# Patient Record
Sex: MALE | Race: WHITE | HISPANIC OR LATINO | Employment: FULL TIME | URBAN - METROPOLITAN AREA
[De-identification: names, ages, dates, MRNs, and addresses within clinical notes are randomized per-mention and may not be internally consistent; named-entity substitution may affect disease eponyms.]

---

## 2020-06-05 ENCOUNTER — OFFICE VISIT (OUTPATIENT)
Dept: ADMISSIONS | Facility: MEDICAL CENTER | Age: 50
End: 2020-06-05
Attending: SURGERY
Payer: COMMERCIAL

## 2020-06-05 DIAGNOSIS — Z01.812 PRE-OPERATIVE LABORATORY EXAMINATION: ICD-10-CM

## 2020-06-05 LAB
ANION GAP SERPL CALC-SCNC: 11 MMOL/L (ref 7–16)
BASOPHILS # BLD AUTO: 0.4 % (ref 0–1.8)
BASOPHILS # BLD: 0.03 K/UL (ref 0–0.12)
BUN SERPL-MCNC: 19 MG/DL (ref 8–22)
CALCIUM SERPL-MCNC: 9.4 MG/DL (ref 8.5–10.5)
CHLORIDE SERPL-SCNC: 102 MMOL/L (ref 96–112)
CO2 SERPL-SCNC: 24 MMOL/L (ref 20–33)
COVID ORDER STATUS COVID19: NORMAL
CREAT SERPL-MCNC: 0.75 MG/DL (ref 0.5–1.4)
EOSINOPHIL # BLD AUTO: 0.08 K/UL (ref 0–0.51)
EOSINOPHIL NFR BLD: 1 % (ref 0–6.9)
ERYTHROCYTE [DISTWIDTH] IN BLOOD BY AUTOMATED COUNT: 40.9 FL (ref 35.9–50)
GLUCOSE SERPL-MCNC: 127 MG/DL (ref 65–99)
HCT VFR BLD AUTO: 47.9 % (ref 42–52)
HGB BLD-MCNC: 16.2 G/DL (ref 14–18)
IMM GRANULOCYTES # BLD AUTO: 0.02 K/UL (ref 0–0.11)
IMM GRANULOCYTES NFR BLD AUTO: 0.2 % (ref 0–0.9)
LYMPHOCYTES # BLD AUTO: 2.59 K/UL (ref 1–4.8)
LYMPHOCYTES NFR BLD: 32.2 % (ref 22–41)
MCH RBC QN AUTO: 29.5 PG (ref 27–33)
MCHC RBC AUTO-ENTMCNC: 33.8 G/DL (ref 33.7–35.3)
MCV RBC AUTO: 87.1 FL (ref 81.4–97.8)
MONOCYTES # BLD AUTO: 0.59 K/UL (ref 0–0.85)
MONOCYTES NFR BLD AUTO: 7.3 % (ref 0–13.4)
NEUTROPHILS # BLD AUTO: 4.73 K/UL (ref 1.82–7.42)
NEUTROPHILS NFR BLD: 58.9 % (ref 44–72)
NRBC # BLD AUTO: 0 K/UL
NRBC BLD-RTO: 0 /100 WBC
PLATELET # BLD AUTO: 214 K/UL (ref 164–446)
PMV BLD AUTO: 10.6 FL (ref 9–12.9)
POTASSIUM SERPL-SCNC: 4.2 MMOL/L (ref 3.6–5.5)
RBC # BLD AUTO: 5.5 M/UL (ref 4.7–6.1)
SODIUM SERPL-SCNC: 137 MMOL/L (ref 135–145)
WBC # BLD AUTO: 8 K/UL (ref 4.8–10.8)

## 2020-06-05 PROCEDURE — 36415 COLL VENOUS BLD VENIPUNCTURE: CPT

## 2020-06-05 PROCEDURE — 85025 COMPLETE CBC W/AUTO DIFF WBC: CPT

## 2020-06-05 PROCEDURE — 80048 BASIC METABOLIC PNL TOTAL CA: CPT

## 2020-06-05 PROCEDURE — C9803 HOPD COVID-19 SPEC COLLECT: HCPCS

## 2020-06-05 RX ORDER — M-VIT,TX,IRON,MINS/CALC/FOLIC 27MG-0.4MG
1 TABLET ORAL DAILY
COMMUNITY

## 2020-06-07 LAB
SARS-COV-2 RNA RESP QL NAA+PROBE: NOT DETECTED
SPECIMEN SOURCE: NORMAL

## 2020-06-10 ENCOUNTER — ANESTHESIA (OUTPATIENT)
Dept: SURGERY | Facility: MEDICAL CENTER | Age: 50
End: 2020-06-10
Payer: COMMERCIAL

## 2020-06-10 ENCOUNTER — HOSPITAL ENCOUNTER (OUTPATIENT)
Facility: MEDICAL CENTER | Age: 50
End: 2020-06-10
Attending: SURGERY | Admitting: SURGERY
Payer: COMMERCIAL

## 2020-06-10 ENCOUNTER — ANESTHESIA EVENT (OUTPATIENT)
Dept: SURGERY | Facility: MEDICAL CENTER | Age: 50
End: 2020-06-10
Payer: COMMERCIAL

## 2020-06-10 VITALS
HEIGHT: 68 IN | WEIGHT: 229.94 LBS | SYSTOLIC BLOOD PRESSURE: 108 MMHG | RESPIRATION RATE: 14 BRPM | TEMPERATURE: 97.7 F | BODY MASS INDEX: 34.85 KG/M2 | DIASTOLIC BLOOD PRESSURE: 66 MMHG | HEART RATE: 73 BPM | OXYGEN SATURATION: 100 %

## 2020-06-10 DIAGNOSIS — K40.91 UNILATERAL RECURRENT INGUINAL HERNIA WITHOUT OBSTRUCTION OR GANGRENE: ICD-10-CM

## 2020-06-10 PROCEDURE — 700101 HCHG RX REV CODE 250: Performed by: ANESTHESIOLOGY

## 2020-06-10 PROCEDURE — 160048 HCHG OR STATISTICAL LEVEL 1-5: Performed by: SURGERY

## 2020-06-10 PROCEDURE — C1781 MESH (IMPLANTABLE): HCPCS | Performed by: SURGERY

## 2020-06-10 PROCEDURE — 160036 HCHG PACU - EA ADDL 30 MINS PHASE I: Performed by: SURGERY

## 2020-06-10 PROCEDURE — 160002 HCHG RECOVERY MINUTES (STAT): Performed by: SURGERY

## 2020-06-10 PROCEDURE — A9270 NON-COVERED ITEM OR SERVICE: HCPCS | Performed by: ANESTHESIOLOGY

## 2020-06-10 PROCEDURE — 160028 HCHG SURGERY MINUTES - 1ST 30 MINS LEVEL 3: Performed by: SURGERY

## 2020-06-10 PROCEDURE — 160025 RECOVERY II MINUTES (STATS): Performed by: SURGERY

## 2020-06-10 PROCEDURE — 160009 HCHG ANES TIME/MIN: Performed by: SURGERY

## 2020-06-10 PROCEDURE — 160046 HCHG PACU - 1ST 60 MINS PHASE II: Performed by: SURGERY

## 2020-06-10 PROCEDURE — 501838 HCHG SUTURE GENERAL: Performed by: SURGERY

## 2020-06-10 PROCEDURE — 500380 HCHG DRAIN, PENROSE 1/4X12: Performed by: SURGERY

## 2020-06-10 PROCEDURE — A9270 NON-COVERED ITEM OR SERVICE: HCPCS | Performed by: SURGERY

## 2020-06-10 PROCEDURE — 700111 HCHG RX REV CODE 636 W/ 250 OVERRIDE (IP): Performed by: ANESTHESIOLOGY

## 2020-06-10 PROCEDURE — 160035 HCHG PACU - 1ST 60 MINS PHASE I: Performed by: SURGERY

## 2020-06-10 PROCEDURE — 500002 HCHG ADHESIVE, DERMABOND: Performed by: SURGERY

## 2020-06-10 PROCEDURE — 700105 HCHG RX REV CODE 258: Performed by: SURGERY

## 2020-06-10 PROCEDURE — 160047 HCHG PACU  - EA ADDL 30 MINS PHASE II: Performed by: SURGERY

## 2020-06-10 PROCEDURE — 160039 HCHG SURGERY MINUTES - EA ADDL 1 MIN LEVEL 3: Performed by: SURGERY

## 2020-06-10 PROCEDURE — 700102 HCHG RX REV CODE 250 W/ 637 OVERRIDE(OP): Performed by: ANESTHESIOLOGY

## 2020-06-10 PROCEDURE — 700102 HCHG RX REV CODE 250 W/ 637 OVERRIDE(OP): Performed by: SURGERY

## 2020-06-10 PROCEDURE — 700101 HCHG RX REV CODE 250: Performed by: SURGERY

## 2020-06-10 DEVICE — MESH PROGRIP LEFT (1/EA): Type: IMPLANTABLE DEVICE | Site: GROIN | Status: FUNCTIONAL

## 2020-06-10 RX ORDER — DIPHENHYDRAMINE HYDROCHLORIDE 50 MG/ML
12.5 INJECTION INTRAMUSCULAR; INTRAVENOUS
Status: DISCONTINUED | OUTPATIENT
Start: 2020-06-10 | End: 2020-06-10 | Stop reason: HOSPADM

## 2020-06-10 RX ORDER — DEXAMETHASONE SODIUM PHOSPHATE 4 MG/ML
INJECTION, SOLUTION INTRA-ARTICULAR; INTRALESIONAL; INTRAMUSCULAR; INTRAVENOUS; SOFT TISSUE PRN
Status: DISCONTINUED | OUTPATIENT
Start: 2020-06-10 | End: 2020-06-10 | Stop reason: SURG

## 2020-06-10 RX ORDER — MIDAZOLAM HYDROCHLORIDE 1 MG/ML
INJECTION INTRAMUSCULAR; INTRAVENOUS PRN
Status: DISCONTINUED | OUTPATIENT
Start: 2020-06-10 | End: 2020-06-10 | Stop reason: SURG

## 2020-06-10 RX ORDER — LABETALOL HYDROCHLORIDE 5 MG/ML
5 INJECTION, SOLUTION INTRAVENOUS
Status: DISCONTINUED | OUTPATIENT
Start: 2020-06-10 | End: 2020-06-10 | Stop reason: HOSPADM

## 2020-06-10 RX ORDER — BUPIVACAINE HYDROCHLORIDE AND EPINEPHRINE 2.5; 5 MG/ML; UG/ML
INJECTION, SOLUTION EPIDURAL; INFILTRATION; INTRACAUDAL; PERINEURAL
Status: DISCONTINUED | OUTPATIENT
Start: 2020-06-10 | End: 2020-06-10 | Stop reason: HOSPADM

## 2020-06-10 RX ORDER — HYDROCODONE BITARTRATE AND ACETAMINOPHEN 5; 325 MG/1; MG/1
1 TABLET ORAL EVERY 6 HOURS PRN
Qty: 20 TAB | Refills: 0 | Status: SHIPPED | OUTPATIENT
Start: 2020-06-10 | End: 2020-06-14

## 2020-06-10 RX ORDER — HYDROMORPHONE HYDROCHLORIDE 1 MG/ML
0.4 INJECTION, SOLUTION INTRAMUSCULAR; INTRAVENOUS; SUBCUTANEOUS
Status: DISCONTINUED | OUTPATIENT
Start: 2020-06-10 | End: 2020-06-10 | Stop reason: HOSPADM

## 2020-06-10 RX ORDER — CEFAZOLIN SODIUM 1 G/3ML
INJECTION, POWDER, FOR SOLUTION INTRAMUSCULAR; INTRAVENOUS PRN
Status: DISCONTINUED | OUTPATIENT
Start: 2020-06-10 | End: 2020-06-10 | Stop reason: SURG

## 2020-06-10 RX ORDER — SODIUM CHLORIDE, SODIUM LACTATE, POTASSIUM CHLORIDE, CALCIUM CHLORIDE 600; 310; 30; 20 MG/100ML; MG/100ML; MG/100ML; MG/100ML
INJECTION, SOLUTION INTRAVENOUS CONTINUOUS
Status: DISCONTINUED | OUTPATIENT
Start: 2020-06-10 | End: 2020-06-10 | Stop reason: HOSPADM

## 2020-06-10 RX ORDER — HYDROMORPHONE HYDROCHLORIDE 1 MG/ML
0.2 INJECTION, SOLUTION INTRAMUSCULAR; INTRAVENOUS; SUBCUTANEOUS
Status: DISCONTINUED | OUTPATIENT
Start: 2020-06-10 | End: 2020-06-10 | Stop reason: HOSPADM

## 2020-06-10 RX ORDER — HYDROMORPHONE HYDROCHLORIDE 2 MG/ML
INJECTION, SOLUTION INTRAMUSCULAR; INTRAVENOUS; SUBCUTANEOUS PRN
Status: DISCONTINUED | OUTPATIENT
Start: 2020-06-10 | End: 2020-06-10 | Stop reason: SURG

## 2020-06-10 RX ORDER — OXYCODONE HCL 5 MG/5 ML
5 SOLUTION, ORAL ORAL
Status: COMPLETED | OUTPATIENT
Start: 2020-06-10 | End: 2020-06-10

## 2020-06-10 RX ORDER — GABAPENTIN 300 MG/1
300 CAPSULE ORAL ONCE
Status: COMPLETED | OUTPATIENT
Start: 2020-06-10 | End: 2020-06-10

## 2020-06-10 RX ORDER — ONDANSETRON 2 MG/ML
4 INJECTION INTRAMUSCULAR; INTRAVENOUS
Status: COMPLETED | OUTPATIENT
Start: 2020-06-10 | End: 2020-06-10

## 2020-06-10 RX ORDER — LIDOCAINE HYDROCHLORIDE 20 MG/ML
INJECTION, SOLUTION EPIDURAL; INFILTRATION; INTRACAUDAL; PERINEURAL PRN
Status: DISCONTINUED | OUTPATIENT
Start: 2020-06-10 | End: 2020-06-10 | Stop reason: SURG

## 2020-06-10 RX ORDER — HYDROMORPHONE HYDROCHLORIDE 1 MG/ML
0.1 INJECTION, SOLUTION INTRAMUSCULAR; INTRAVENOUS; SUBCUTANEOUS
Status: DISCONTINUED | OUTPATIENT
Start: 2020-06-10 | End: 2020-06-10 | Stop reason: HOSPADM

## 2020-06-10 RX ORDER — ACETAMINOPHEN 500 MG
1000 TABLET ORAL ONCE
Status: COMPLETED | OUTPATIENT
Start: 2020-06-10 | End: 2020-06-10

## 2020-06-10 RX ORDER — KETOROLAC TROMETHAMINE 30 MG/ML
INJECTION, SOLUTION INTRAMUSCULAR; INTRAVENOUS PRN
Status: DISCONTINUED | OUTPATIENT
Start: 2020-06-10 | End: 2020-06-10 | Stop reason: SURG

## 2020-06-10 RX ORDER — ROCURONIUM BROMIDE 10 MG/ML
INJECTION, SOLUTION INTRAVENOUS PRN
Status: DISCONTINUED | OUTPATIENT
Start: 2020-06-10 | End: 2020-06-10 | Stop reason: SURG

## 2020-06-10 RX ORDER — OXYCODONE HCL 5 MG/5 ML
10 SOLUTION, ORAL ORAL
Status: COMPLETED | OUTPATIENT
Start: 2020-06-10 | End: 2020-06-10

## 2020-06-10 RX ORDER — HYDRALAZINE HYDROCHLORIDE 20 MG/ML
5 INJECTION INTRAMUSCULAR; INTRAVENOUS
Status: DISCONTINUED | OUTPATIENT
Start: 2020-06-10 | End: 2020-06-10 | Stop reason: HOSPADM

## 2020-06-10 RX ADMIN — ROCURONIUM BROMIDE 50 MG: 10 INJECTION, SOLUTION INTRAVENOUS at 14:48

## 2020-06-10 RX ADMIN — SUGAMMADEX 200 MG: 100 INJECTION, SOLUTION INTRAVENOUS at 15:58

## 2020-06-10 RX ADMIN — ONDANSETRON HYDROCHLORIDE 4 MG: 2 SOLUTION INTRAMUSCULAR; INTRAVENOUS at 17:56

## 2020-06-10 RX ADMIN — HYDROMORPHONE HYDROCHLORIDE 1 MG: 2 INJECTION, SOLUTION INTRAMUSCULAR; INTRAVENOUS; SUBCUTANEOUS at 15:26

## 2020-06-10 RX ADMIN — KETOROLAC TROMETHAMINE 30 MG: 30 INJECTION, SOLUTION INTRAMUSCULAR at 15:10

## 2020-06-10 RX ADMIN — OXYCODONE HYDROCHLORIDE 10 MG: 5 SOLUTION ORAL at 16:39

## 2020-06-10 RX ADMIN — ACETAMINOPHEN 1000 MG: 500 TABLET ORAL at 12:20

## 2020-06-10 RX ADMIN — POVIDONE-IODINE 15 ML: 10 SOLUTION TOPICAL at 12:20

## 2020-06-10 RX ADMIN — DEXAMETHASONE SODIUM PHOSPHATE 8 MG: 4 INJECTION, SOLUTION INTRA-ARTICULAR; INTRALESIONAL; INTRAMUSCULAR; INTRAVENOUS; SOFT TISSUE at 14:48

## 2020-06-10 RX ADMIN — DIPHENHYDRAMINE HYDROCHLORIDE 12.5 MG: 50 INJECTION INTRAMUSCULAR; INTRAVENOUS at 18:13

## 2020-06-10 RX ADMIN — FENTANYL CITRATE 100 MCG: 50 INJECTION INTRAMUSCULAR; INTRAVENOUS at 14:48

## 2020-06-10 RX ADMIN — SODIUM CHLORIDE, POTASSIUM CHLORIDE, SODIUM LACTATE AND CALCIUM CHLORIDE: 600; 310; 30; 20 INJECTION, SOLUTION INTRAVENOUS at 12:44

## 2020-06-10 RX ADMIN — CEFAZOLIN 2 G: 330 INJECTION, POWDER, FOR SOLUTION INTRAMUSCULAR; INTRAVENOUS at 14:40

## 2020-06-10 RX ADMIN — MIDAZOLAM HYDROCHLORIDE 2 MG: 1 INJECTION, SOLUTION INTRAMUSCULAR; INTRAVENOUS at 14:40

## 2020-06-10 RX ADMIN — LIDOCAINE HYDROCHLORIDE 100 MG: 20 INJECTION, SOLUTION EPIDURAL; INFILTRATION; INTRACAUDAL at 14:48

## 2020-06-10 RX ADMIN — PROPOFOL 200 MG: 10 INJECTION, EMULSION INTRAVENOUS at 14:48

## 2020-06-10 RX ADMIN — LIDOCAINE HYDROCHLORIDE 0.5 ML: 10 INJECTION, SOLUTION EPIDURAL; INFILTRATION; INTRACAUDAL at 12:21

## 2020-06-10 RX ADMIN — GABAPENTIN 300 MG: 300 CAPSULE ORAL at 12:20

## 2020-06-10 ASSESSMENT — PAIN SCALES - GENERAL: PAIN_LEVEL: 2

## 2020-06-10 NOTE — ANESTHESIA PREPROCEDURE EVALUATION
49 yo for repair of recurrent LIH (3 rd time, last was 20 years ago    PMH: obesity (BMI 33)  Relevant Problems   No relevant active problems       Physical Exam    Airway   Mallampati: II  TM distance: >3 FB  Neck ROM: full       Cardiovascular - normal exam  Rhythm: regular  Rate: normal  (-) murmur     Dental - normal exam           Pulmonary - normal exam  Breath sounds clear to auscultation     Abdominal    Neurological - normal exam                 Anesthesia Plan    ASA 2       Plan - general       Airway plan will be ETT  (TAP block)    Plan Factors:   Patient was not previously instructed to abstain from smoking on day of procedure.  Patient did not smoke on day of procedure.      Induction: intravenous    Postoperative Plan: Postoperative administration of opioids is intended.    Pertinent diagnostic labs and testing reviewed    Informed Consent:    Anesthetic plan and risks discussed with patient.    Use of blood products discussed with: patient whom consented to blood products.

## 2020-06-10 NOTE — ANESTHESIA PROCEDURE NOTES
Airway    Date/Time: 6/10/2020 2:49 PM  Performed by: Lorenzo Vann M.D.  Authorized by: Lorenzo Vann M.D.     Location:  OR  Urgency:  Elective  Difficult Airway: No    Indications for Airway Management:  Anesthesia      Spontaneous Ventilation: absent    Sedation Level:  Deep  Preoxygenated: Yes    Patient Position:  Sniffing  MILS Maintained Throughout: No    Mask Difficulty Assessment:  0 - not attempted  Final Airway Type:  Endotracheal airway  Final Endotracheal Airway:  ETT  Cuffed: Yes    Technique Used for Successful ETT Placement:  Direct laryngoscopy    Insertion Site:  Oral  Blade Type:  Ekiko  Laryngoscope Blade/Videolaryngoscope Blade Size:  3  ETT Size (mm):  7.5  Measured from:  Teeth  ETT to Teeth (cm):  20  Placement Verified by: auscultation and capnometry    Cormack-Lehane Classification:  Grade I - full view of glottis  Number of Attempts at Approach:  1

## 2020-06-10 NOTE — ANESTHESIA POSTPROCEDURE EVALUATION
Patient: Amari Calero    Procedure Summary     Date:  06/10/20 Room / Location:  [facility] OR 09 / SURGERY [facility]    Anesthesia Start:  1440 Anesthesia Stop:  1612    Procedure:  REPAIR, HERNIA, INGUINAL - REDO RECURRENT WITH PROGRIP MESH (Left Groin) Diagnosis:  (SYMPTOMATIC RECURRENT LEFT INGUINAL HERNIA)    Surgeon:  Amish Logan M.D. Responsible Provider:  Lorenzo Vann M.D.    Anesthesia Type:  general, peripheral nerve block ASA Status:  2          Final Anesthesia Type: general, peripheral nerve block  Last vitals  BP   Blood Pressure: 139/89    Temp   36.3 °C (97.4 °F)    Pulse   Pulse: 84   Resp   16    SpO2   100 %      Anesthesia Post Evaluation    Patient location during evaluation: PACU  Patient participation: complete - patient participated  Level of consciousness: sleepy but conscious  Pain score: 2    Airway patency: patent  Anesthetic complications: no  Cardiovascular status: hemodynamically stable  Respiratory status: acceptable  Hydration status: euvolemic    PONV: none           Nurse Pain Score: 0 (NPRS)

## 2020-06-10 NOTE — OR SURGEON
Immediate Post OP Note    PreOp Diagnosis: Symptomatic, large recurrent left inguinal hernia.    PostOp Diagnosis: Same.    Procedure(s):  REPAIR, HERNIA, LEFT INGUINAL - REDO RECURRENT WITH PROGRIP MESH - Wound Class: Clean    Surgeon(s):  Amish Logan M.D.    Anesthesiologist/Type of Anesthesia:  Anesthesiologist: Lorenzo Vann M.D./General    Surgical Staff:  Circulator: Miryam Stroud R.N.  Scrub Person: Fernando Cosme    Specimens removed if any:  None.    Estimated Blood Loss: 2ml.    IVF: 800ml.    Findings: Large direct and indirect hernias.    Complications: None.    Dictated, #[id number].        6/10/2020 4:07 PM Amish Logan M.D.

## 2020-06-10 NOTE — ANESTHESIA TIME REPORT
Anesthesia Start and Stop Event Times     Date Time Event    6/10/2020 1236 Ready for Procedure     1440 Anesthesia Start     1612 Anesthesia Stop        Responsible Staff  06/10/20    Name Role Begin End    Lorenzo Vann M.D. Anesth 1440 1612        Preop Diagnosis (Free Text):  Pre-op Diagnosis     SYMPTOMATIC RECURRENT LEFT INGUINAL HERNIA        Preop Diagnosis (Codes):    Post op Diagnosis  Recurrent left inguinal hernia      Premium Reason  A. 3PM - 7AM    Comments:

## 2020-06-11 NOTE — OR NURSING
Pt. Reports nausea and emesis x1. Zofran given to help with nausea. Per pt. No relief with zofran, so Benadryl given. Pt. Resting in chair. Will cont. To monitor.

## 2020-06-11 NOTE — DISCHARGE INSTRUCTIONS
ACTIVITY: Rest and take it easy for the first 24 hours.  A responsible adult is recommended to remain with you during that time.  It is normal to feel sleepy.  We encourage you to not do anything that requires balance, judgment or coordination.    MILD FLU-LIKE SYMPTOMS ARE NORMAL. YOU MAY EXPERIENCE GENERALIZED MUSCLE ACHES, THROAT IRRITATION, HEADACHE AND/OR SOME NAUSEA.    FOR 24 HOURS DO NOT:  Drive, operate machinery or run household appliances.  Drink beer or alcoholic beverages.   Make important decisions or sign legal documents.    SPECIAL INSTRUCTIONS:   1) Activity: No lifting more than 10lbs for  4 weeks.      2) Resume home meds.     May take Ibuprofen 600mg (three 200mg tablets) every 8 hours, with food, as needed for mild pain.   For moderate pain, take Norco as prescribed.   May take OTC stool softener or laxative as needed.       3) Follow up with Dr. Logan in weeks.  Call [phone] for appointment and for any problem.       DIET: To avoid nausea, slowly advance diet as tolerated, avoiding spicy or greasy foods for the first day.  Add more substantial food to your diet according to your physician's instructions.  Babies can be fed formula or breast milk as soon as they are hungry.  INCREASE FLUIDS AND FIBER TO AVOID CONSTIPATION.    SURGICAL DRESSING/BATHING: May shower after 2 days.  No bath for 2 weeks    FOLLOW-UP APPOINTMENT:  A follow-up appointment should be arranged with your doctor in 1-2 weeks; call to schedule.    You should CALL YOUR PHYSICIAN if you develop:  Fever greater than 101 degrees F.  Pain not relieved by medication, or persistent nausea or vomiting.  Excessive bleeding (blood soaking through dressing) or unexpected drainage from the wound.  Extreme redness or swelling around the incision site, drainage of pus or foul smelling drainage.  Inability to urinate or empty your bladder within 8 hours.  Problems with breathing or chest pain.    You should call [phone] if you develop  problems with breathing or chest pain.  If you are unable to contact your doctor or surgical center, you should go to the nearest emergency room or urgent care center.  Physician's telephone #: [phone]    If any questions arise, call your doctor.  If your doctor is not available, please feel free to call the Surgical Center at [phone].  The Center is open Monday through Friday from 7AM to 7PM.  You can also call the HEALTH HOTLINE open 24 hours/day, 7 days/week and speak to a nurse at [phone], or toll free at [phone].    A registered nurse may call you a few days after your surgery to see how you are doing after your procedure.    MEDICATIONS: Resume taking daily medication.  Take prescribed pain medication with food.  If no medication is prescribed, you may take non-aspirin pain medication if needed.  PAIN MEDICATION CAN BE VERY CONSTIPATING.  Take a stool softener or laxative such as senokot, pericolace, or milk of magnesia if needed.    Prescription given for Norco .  Last pain medication given at 4:39 PM. Next dose at 8:39 PM    If your physician has prescribed pain medication that includes Acetaminophen (Tylenol), do not take additional Acetaminophen (Tylenol) while taking the prescribed medication.    Depression / Suicide Risk    As you are discharged from this [facility] facility, it is important to learn how to keep safe from harming yourself.    Recognize the warning signs:  · Abrupt changes in personality, positive or negative- including increase in energy   · Giving away possessions  · Change in eating patterns- significant weight changes-  positive or negative  · Change in sleeping patterns- unable to sleep or sleeping all the time   · Unwillingness or inability to communicate  · Depression  · Unusual sadness, discouragement and loneliness  · Talk of wanting to die  · Neglect of personal appearance   · Rebelliousness- reckless behavior  · Withdrawal from people/activities they  love  · Confusion- inability to concentrate     If you or a loved one observes any of these behaviors or has concerns about self-harm, here's what you can do:  · Talk about it- your feelings and reasons for harming yourself  · Remove any means that you might use to hurt yourself (examples: pills, rope, extension cords, firearm)  · Get professional help from the community (Mental Health, Substance Abuse, psychological counseling)  · Do not be alone:Call your Safe Contact- someone whom you trust who will be there for you.  · Call your local CRISIS HOTLINE 665-2821 or 836-979-5678  · Call your local Children's Mobile Crisis Response Team [place] (713) 599-9581 or www.m0um0u  · Call the toll free National Suicide Prevention Hotlines   · National Suicide Prevention Lifeline 805-388-GSFE (4954)  · National Hope Line Network 800-SUICIDE (021-9783)

## 2020-06-11 NOTE — OR NURSING
Arrived to Phase II after report from SAURABH Ely    VSS, pt. Reports nausea, pain 5/10. Ambulated from gurney to chair with assistance.    Surgical site CDI, no drainage.     Alarms on and set to appropriate parameters. Call light within reach, rounding in place. Daughter at bedside.

## 2020-06-11 NOTE — OP REPORT
DATE OF SERVICE:  06/10/2020    SURGEON:  Amish Logan MD    ANESTHESIOLOGIST:  Lorenzo Vann MD    TYPE OF ANESTHESIA:  General anesthesia.    PREOPERATIVE DIAGNOSIS:  Symptomatic large recurrent left inguinal hernia.    POSTOPERATIVE DIAGNOSIS:  Symptomatic large recurrent left inguinal hernia.    PROCEDURE:  Open repair of symptomatic large recurrent left inguinal hernia   using left Parietex ProGrip mesh.    INDICATION FOR PROCEDURE:  This is a gentleman who was referred to see me for   a large recurrent left inguinal hernia, which has been causing him to have   pain.  Discussion was made with patient.  He would like to undergo open   repair, fully understanding all risks.    DESCRIPTION OF PROCEDURE:  Informed consent was obtained.  The patient was   taken to the operating room and was placed in the supine position.  Sequential   compression device was applied.  Patient was given Ancef intravenously.    General anesthesia was induced.  Next, the left lower abdomen, left thigh, and   perineum were sterilely prepped and draped in the normal fashion.  A time-out   procedure was done.  A nerve block was performed with 0.25% Marcaine with   epinephrine solution.  The skin and subcutaneous tissues over the hernia site   were also anesthetized with the same solution.  A slightly oblique incision   was made over the left inguinal area.  The incision was extended through   subcutaneous tissue and Ritchie's fascia using the electrocautery.  The   subcutaneous tissues were cleared off the external oblique fascia.  The   external oblique fascia was sharply opened with care taken to avoid injury to   the nerves.  There was induration seen.  The cord was dissected off the floor.    There was a very large direct as well as indirect hernia.  The omentum was   in the scrotum.  It was reduced out of the scrotum along with the hernia sac   and reduced back into the peritoneal cavity.  The defect was imbricated with   3-0  Vicryl sutures with care taken to avoid entrapment of the nerves.    Next, left Parietex ProGrip mesh was brought into the operative field, placed   on the inguinal floor as well as wrapped around the cord to reinforce the   ring.  The mesh was secured in place using 0 Vicryl suture.  The external   oblique fascia was reapproximated over the mesh with interrupted 3-0 Vicryl   suture.  The Ritchie's fascia was also reapproximated with running 3-0 Vicryl   suture.  The wound was closed subcutaneously with running 3-0 Vicryl suture   and subcuticularly with 4-0 Monocryl suture.  The wound was cleaned and   Dermabond was applied.  Patient's left testicle was retracted down to its   normal anatomical position.    Patient was then awakened, extubated, taken to recovery area in stable   condition.    ESTIMATED BLOOD LOSS:  3 mL.    INTRAVENOUS FLUID:  800 mL crystalloid.    LOCAL ANESTHETIC AGENT:  A 30 mL of 0.25% Marcaine with epinephrine solution.    Sponge and instrument count was correct x2.       ____________________________________     Amish Logan MD    TQN / NTS    DD:  06/10/2020 16:12:22  DT:  06/10/2020 20:03:43    D#:  [id number]  Job#:  [id number]    cc: DARIUS CARRANZA MD

## 2020-06-11 NOTE — OR NURSING
Pt's VSS; denies N/V; Pt. States he feels much better. States pain is at tolerable level. Dressing CDI to left groin. D/c orders received. IV dc'd. Pt changed into clothing on own. Pt up and ambulated to BR, steady gait, voided adequately. Discharge instructions given as well as pain management handout; pt and family verbalized understanding and questions answered. Patient states ready to d/c home. Rx for [place] given to pt. daughter. Pt dc'd in w/c with CNA in stable condition.

## 2021-09-23 ENCOUNTER — TELEPHONE (OUTPATIENT)
Dept: NEUROLOGY | Facility: MEDICAL CENTER | Age: 51
End: 2021-09-23

## 2021-10-28 ENCOUNTER — OFFICE VISIT (OUTPATIENT)
Dept: BEHAVIORAL HEALTH | Facility: CLINIC | Age: 51
End: 2021-10-28
Payer: COMMERCIAL

## 2021-10-28 DIAGNOSIS — F41.1 GAD (GENERALIZED ANXIETY DISORDER): ICD-10-CM

## 2021-10-28 DIAGNOSIS — F41.0 PANIC ATTACKS: ICD-10-CM

## 2021-10-28 PROCEDURE — 96127 BRIEF EMOTIONAL/BEHAV ASSMT: CPT | Performed by: PSYCHIATRY & NEUROLOGY

## 2021-10-28 PROCEDURE — 99205 OFFICE O/P NEW HI 60 MIN: CPT | Performed by: PSYCHIATRY & NEUROLOGY

## 2021-10-28 RX ORDER — ESCITALOPRAM OXALATE 10 MG/1
TABLET ORAL
Qty: 44 TABLET | Refills: 0 | Status: SHIPPED | OUTPATIENT
Start: 2021-10-28 | End: 2021-11-30

## 2021-10-28 ASSESSMENT — ANXIETY QUESTIONNAIRES
4. TROUBLE RELAXING: SEVERAL DAYS
GAD7 TOTAL SCORE: 13
6. BECOMING EASILY ANNOYED OR IRRITABLE: MORE THAN HALF THE DAYS
5. BEING SO RESTLESS THAT IT IS HARD TO SIT STILL: MORE THAN HALF THE DAYS
3. WORRYING TOO MUCH ABOUT DIFFERENT THINGS: NEARLY EVERY DAY
IF YOU CHECKED OFF ANY PROBLEMS ON THIS QUESTIONNAIRE, HOW DIFFICULT HAVE THESE PROBLEMS MADE IT FOR YOU TO DO YOUR WORK, TAKE CARE OF THINGS AT HOME, OR GET ALONG WITH OTHER PEOPLE: SOMEWHAT DIFFICULT
2. NOT BEING ABLE TO STOP OR CONTROL WORRYING: SEVERAL DAYS
1. FEELING NERVOUS, ANXIOUS, OR ON EDGE: MORE THAN HALF THE DAYS
7. FEELING AFRAID AS IF SOMETHING AWFUL MIGHT HAPPEN: MORE THAN HALF THE DAYS

## 2021-10-28 ASSESSMENT — PATIENT HEALTH QUESTIONNAIRE - PHQ9
SUM OF ALL RESPONSES TO PHQ QUESTIONS 1-9: 9
5. POOR APPETITE OR OVEREATING: 0 - NOT AT ALL
CLINICAL INTERPRETATION OF PHQ2 SCORE: 2

## 2021-10-28 NOTE — PROGRESS NOTES
"IN-PERSON SESSION IN CLINIC      INITIAL PSYCHIATRIC EVALUATION      This provider informed the patient their medical records are totally confidential except for the use by other providers involved in their care, or if the patient signs a release, or to report instances of child or elder abuse, or if it is determined they are an immediate risk to harm themselves or others.    Danish INTERPRETOR # [id number]    CHIEF COMPLAINT  \" To help with anxiety\"      HISTORY OF PRESENT ILLNESS  Amari Calero is a 51 y.o. old male comes in today to establish care and for evaluation of anxiety and panic attacks.  I did reviewed all outpatient psychiatry follow up notes over last 3 years. Patient is new to the clinic.     His current medications include:  • amitriptyline (ELAVIL) 25 mg tablet Take 1 tablet (25 mg) by mouth at bedtime.   • gabapentin (NEURONTIN) 300 mg capsule Take 1 capsule (300 mg) by mouth 3 times daily.   • atorvastatin (LIPITOR) 40 mg tablet TAKE 1 TABLET (40 MG) BY MOUTH DAILY AT 9 PM FOR 30 DAYS.   • cyanocobalamin (VITAMIN B-12) 1000 mcg tablet Take 1 tablet by mouth.     Patient reports struggling with anxiety since childhood and describes this as cyclical in nature and every 5 to 10 years he feels sort of anxiety with panic attack.  Patient was recently admitted to hospital 1 year ago for worsening of panic attack and reports getting an extensive evaluation done and is currently following with neurology and no medical cause was found.  He was referred to psychiatry for evaluation of anxiety and its treatment.  On evaluation patient reports anxiety at least half days of the week with difficulty controlling this worry and reports worrying about multiple topics that causes muscle tension and difficulty relaxing.  3 months after getting discharged from hospital patient started having headaches and describes this as heat like sensation starting from back of the head.  He was started on gabapentin which " he reports as helpful and has reduced the intensity and frequency of headaches.  Patient is taking amitriptyline at bedtime and reports this as not much helpful and agreed with plan of stopping this and starting Lexapro for anxiety management.  Patient appears to be minimizing his other symptoms and reports feeling depressed few days of the week and on PHQ 9 he scored 9 with dominance of little interest, low energy, concentration issues and feeling restless but denies endorsing suicidal or homicidal ideations.  Patient denies current or past history of ector, hypomania or psychosis.    Patient is not interested in considering psychotherapy at this time.    PSYCHIATRIC REVIEW OF SYSTEMS: denies manic symptoms, denies psychotic symptoms including AH / VH, denies OCD symptoms, denies restrictive eating or purging, denies trauma related symptoms, see HPI for depressive symptoms and see HPI for anxeity symptoms      MEDICAL REVIEW OF SYSTEMS:   Constitutional negative   Eyes negative   Ears/Nose/Mouth/Throat negative   Cardiovascular  Essential hypertension, dyslipidemia   Respiratory negative   Gastrointestinal negative   Genitourinary negative   Muscular negative   Integumentary negative   Neurological  headaches   Endocrine  Glucose intolerance   Hematologic/Lymphatic negative     CURRENT MEDICATIONS:  Current Outpatient Medications   Medication Sig Dispense Refill   • therapeutic multivitamin-minerals (THERAGRAN-M) Tab Take 1 Tab by mouth every day.     • Cyanocobalamin (VITAMIN B 12 PO) Take 1 Tab by mouth every day.       No current facility-administered medications for this visit.       ALLERGIES:  Asa [aspirin]    PAST PSYCHIATRIC HISTORY  Prior psychiatric hospitalization: no  Prior Self harm/suicide attempt: no  Prior Diagnosis: none    PAST PSYCHIATRIC MEDICATIONS  • none     FAMILY HISTORY  Psychiatric diagnosis:  None diagnosed    SUBSTANCE USE HISTORY:  History of alcohol abuse in past but patient has not  drank for more than 1 year    SOCIAL HISTORY  Highest education is high school   and lives with wife  Currently working as a   Denies history of trauma or abuse  Patient was doing boxing in past and had head trauma but denies any blackouts    MEDICAL HISTORY  Past Medical History:   Diagnosis Date   • Bowel habit changes     constipation    • Heart burn    • Indigestion    • Snoring      Past Surgical History:   Procedure Laterality Date   • INGUINAL HERNIA REPAIR Left 6/10/2020    Procedure: REPAIR, HERNIA, INGUINAL - REDO RECURRENT WITH PROGRIP MESH;  Surgeon: Amish Logan M.D.;  Location: SURGERY [facility];  Service: General   • HERNIA REPAIR  1995,2009    x2         PHYSICAL EXAMINAION:  Vital signs: There were no vitals taken for this visit.  Musculoskeletal: Normal gait.   Abnormal movements: none    MENTAL STATUS EXAMINATION      General:   - Grooming and hygiene: Casual,   - Apparent distress: none,   - Behavior: Tense  - Eye Contact:  Limited,   - no psychomotor agitation or retardation    - Participation: Active verbal participation  Orientation: Alert and Fully Oriented to person, place and time  Mood: Anxious  Affect: Constricted,  Thought Process: Goal-directed  Thought Content: Denies suicidal or homicidal ideations, intent or plan Within normal limits  Perception: Denies auditory or visual hallucinations. No delusions noted Within normal limits  Attention span and concentration: Intact   Speech:Rate within normal limits and Volume within normal limits  Language: Appropriate   Insight: Good  Judgment: Good  Recent and remote memory: No gross evidence of memory deficits      DEPRESSION SCREENING:  No flowsheet data found.    Interpretation of PHQ-9 Total Score   Score Severity   1-4 No Depression   5-9 Mild Depression   10-14 Moderate Depression   15-19 Moderately Severe Depression   20-27 Severe Depression      SAFETY ASSESSMENT - SELF:    Does patient acknowledge current or  past symptoms of dangerousness to self? no  History of suicide by family member: no  History of suicide by friend/significant other: no  Recent change in amount/specificity/intensity of suicidal thoughts or self-harm behavior? no  Current access to firearms, medications, or other identified means of suicide/self-harm? no  Protective factors present: family, work       SAFETY ASSESSMENT - OTHERS:    Does patient acknowledge current or past symptoms of aggressive behavior or risk to others? no  Recent change in amount/specificity/intensity of thoughts or threats to harm others? no  Current access to firearms/other identified means of harm? no      CURRENT RISK:       Suicidal: Low       Homicidal: Low       Self-Harm: Low       Relapse: Low       Crisis Safety Plan Reviewed Not Indicated    MEDICAL RECORDS/LABS/DIAGNOSTIC TESTS REVIEWED:   Ref Range & Units 10 mo ago   WBC Count 3.7 - 10.6 x10^3/ul 10.0        RBC Count 4.50 - 5.70 x10^6/ul 5.18        HGB 13.0 - 16.7 g/dl 15.4        Hct 38.8 - 49.7 % 44.7        MCV 83.0 - 99.0 fl 86.3        MCH 28.0 - 33.8 pg 29.7        MCHC 33.1 - 36.5 g/dL 34.4        RDW 11.8 - 14.0 % 13.4        Platelet Count 146 - 390 x10^3/uL 215        MPV 6.4 - 10.2 fl 8.6       Ref Range & Units 10 mo ago   Thyroxine Free 0.58 - 1.64 ng/dl 1.14        TSH 0.34 - 5.60 uIU/ml 0.78      Ref Range & Units 10 mo ago    Sodium 136 - 144 mmol/L 138        POTASSIUM 3.6 - 5.1 mmol/L 3.4 Low         Chloride 101 - 111 mmol/L 104        CARBON DIOXIDE 22 - 32 mmol/L 19 Low         Anion Gap 2 - 11 mmol/L 15 High         Calcium, S/P 8.7 - 10.3 mg/dl 9.4        Glucose, Fasting 60 - 99 mg/dl 162 High         BUN 8 - 20 mg/dl 19        Creatinine 0.80 - 1.40 mg/dl 0.85        Protein, Total 6.4 - 8.2 g/dl 7.4        Albumin 3.5 - 4.8 g/dl 4.1        Globulin 2.6 - 3.1 g/dl 3.3 High         Albumin/Globulin Ratio 1.00 - 2.20 Ratio 1.20        ALKALINE PHOSPHATASE 38 - 126 IU/L 74        ALT 17 - 63  IU/L 25        AST 15 - 41 IU/L 24        Bilirubin, Total 0.4 - 2.0 mg/dl 0.8        BILIRUBIN, DIRECT 0.0 - 0.3 mg/dl 0.1        Indirect Bilirubin 0.0 - 1.5 mg/dl 0.7            [place]  records -   Reviewed      DIFFERENTIAL DIAGNOSES  1. MARIA G, panic attacks  2. R/o comorbid depression      PLAN:  (1) MARIA G, panic attacks rule out comorbid depression  • GAD7: 13; PHQ9: 7  • Stop amitriptyline 25 mg at bedtime and add Lexapro 5 mg daily for 1 week and then increase dose to 10 mg daily after dinner for anxiety and mood management.  • Patient currently not interested in psychotherapy.  • Continue follow-up with neurology for management of comorbid medical conditions.  • Medication options, alternatives (including no medications) and medication risks/benefits/side effects were discussed in detail.  • The patient was advised to call, message provider on [facility], or come in to the clinic if symptoms worsen or if any future questions/issues regarding their medications arise; the patient verbalized understanding and agreement.    • The patient was educated to call 911, call the suicide hotline, or go to local ER if having thoughts of suicide or homicide; verbalized understanding.    03326: based on total time spent of 60 min on evaluation (with ), doing scales, reviewing past records and documentation.     Return to clinic in 6 weeks or sooner if symptoms worsen.  Next Appointment:  instruction provided on how to make the next appointment.     The proposed treatment plan was discussed with the patient who was provided the opportunity to ask questions and make suggestions regarding alternative treatment. Patient verbalized understanding and expressed agreement with the plan.     Thank you for allowing me to participate in the care of this patient.    Randall Miller M.D.  10/28/21    CC:   No primary care provider on file.    This note was created using voice recognition software (Dragon). The accuracy of the  dictation is limited by the abilities of the software. I have reviewed the note prior to signing, however some errors in grammar and context are still possible. If you have any questions related to this note please do not hesitate to contact our office.

## 2021-12-07 ENCOUNTER — OFFICE VISIT (OUTPATIENT)
Dept: BEHAVIORAL HEALTH | Facility: CLINIC | Age: 51
End: 2021-12-07
Payer: COMMERCIAL

## 2021-12-07 DIAGNOSIS — G47.09 OTHER INSOMNIA: ICD-10-CM

## 2021-12-07 DIAGNOSIS — F41.0 PANIC ATTACKS: ICD-10-CM

## 2021-12-07 DIAGNOSIS — F41.1 GAD (GENERALIZED ANXIETY DISORDER): ICD-10-CM

## 2021-12-07 PROCEDURE — 99214 OFFICE O/P EST MOD 30 MIN: CPT | Performed by: PSYCHIATRY & NEUROLOGY

## 2021-12-07 RX ORDER — ESCITALOPRAM OXALATE 20 MG/1
20 TABLET ORAL DAILY
Qty: 90 TABLET | Refills: 0 | Status: SHIPPED | OUTPATIENT
Start: 2021-12-07 | End: 2022-03-04

## 2021-12-07 RX ORDER — DOXEPIN HYDROCHLORIDE 25 MG/1
25 CAPSULE ORAL NIGHTLY PRN
Qty: 60 CAPSULE | Refills: 0 | Status: SHIPPED | OUTPATIENT
Start: 2021-12-07 | End: 2022-02-02

## 2021-12-07 NOTE — PROGRESS NOTES
IN-PERSON SESSION IN CLINIC     PSYCHIATRY FOLLOW-UP NOTE      Name: Amari Calero  MRN: [id number]  : 1970  Age: 51 y.o.  Date of assessment: 2021  PCP: No primary care provider on file.  Persons in attendance: Patient and daughter     #[id number]    REASON FOR VISIT/CHIEF COMPLAINT (as stated by Patient):  Amari Calero is a 51 y.o., White male, attending follow-up appointment for mood and anxiety management.      HISTORY OF PRESENT ILLNESS:  Amari Calero is a 51 y.o. old male with MARIA G and panic attacks comes in today for follow up. Patient was last seen 6 weeks ago, and following treatment planning recommendations were done:  · Stop amitriptyline 25 mg at bedtime and add Lexapro 5 mg daily for 1 week and then increase dose to 10 mg daily after dinner for anxiety and mood management.  · Patient currently not interested in psychotherapy.  · Continue follow-up with neurology for management of comorbid medical conditions.    Patient is compliant with medication but reports not feeling any benefit or side effect from the medications.  Patient noticed decline in sleep after stopping amitriptyline.  Initially was sleeping around 7 to 8 hours but now he is sleeping for 3 to 5 hours.  Patient continues to feel anxious and both family and work-related places have not seen much difference.  Patient agreed with plan of increasing Lexapro to total 20 mg and adding doxepin as needed for sleep.  Patient continues to decline the need for psychotherapy at this time.  Patient denies having physical or verbal anger but reports feeling in her anxiety and describes this as obsessive in nature.    Patient is traveling to [place] in next few weeks and requested 90-day supply for Lexapro.      CURRENT MEDICATIONS:  Current Outpatient Medications   Medication Sig Dispense Refill   • escitalopram (LEXAPRO) 10 MG Tab Take 1 Tablet by mouth every day. 30 Tablet 1   • therapeutic  multivitamin-minerals (THERAGRAN-M) Tab Take 1 Tab by mouth every day.     • Cyanocobalamin (VITAMIN B 12 PO) Take 1 Tab by mouth every day.       No current facility-administered medications for this visit.       MEDICAL HISTORY  Past Medical History:   Diagnosis Date   • Bowel habit changes     constipation    • Heart burn    • Indigestion    • Snoring      Past Surgical History:   Procedure Laterality Date   • INGUINAL HERNIA REPAIR Left 6/10/2020    Procedure: REPAIR, HERNIA, INGUINAL - REDO RECURRENT WITH PROGRIP MESH;  Surgeon: Amish Logan M.D.;  Location: SURGERY [facility];  Service: General   • HERNIA REPAIR  1995,2009    x2       PAST PSYCHIATRIC HISTORY  Prior psychiatric hospitalization: no  Prior Self harm/suicide attempt: no  Prior Diagnosis: none     PAST PSYCHIATRIC MEDICATIONS  · lexapro  · amitriptyilne      FAMILY HISTORY  Psychiatric diagnosis:  None diagnosed     SUBSTANCE USE HISTORY:  History of alcohol abuse in past but patient has not drank for more than 1 year     SOCIAL HISTORY  Highest education is high school   and lives with wife  Currently working as a   Denies history of trauma or abuse  Patient was doing boxing in past and had head trauma but denies any blackouts      REVIEW OF SYSTEMS:        Constitutional negative   Eyes negative   Ears/Nose/Mouth/Throat negative   Cardiovascular  essential hypertension, dyslipidemia   Respiratory negative   Gastrointestinal negative   Genitourinary negative   Muscular negative   Integumentary negative   Neurological  headaches   Endocrine  glucose intolerance   Hematologic/Lymphatic negative     PHYSICAL EXAMINAION:  Vital signs: There were no vitals taken for this visit.  Musculoskeletal: Normal gait.   Abnormal movements: none      MENTAL STATUS EXAMINATION      General:   - Grooming and hygiene: Casual,   - Apparent distress: none,   - Behavior: Tense  - Eye Contact:  Limited,   - no psychomotor agitation or retardation     - Participation: Active verbal participation  Orientation: Alert and Fully Oriented to person, place and time  Mood: Anxious  Affect: Full range,  Thought Process: Goal-directed  Thought Content: Denies suicidal or homicidal ideations, intent or plan Within normal limits  Perception: Denies auditory or visual hallucinations. No delusions noted Within normal limits  Attention span and concentration: Intact   Speech:Rate within normal limits  Language: Appropriate   Insight: Good  Judgment: Good  Recent and remote memory: No gross evidence of memory deficits        DEPRESSION SCREENING:  Depression Screen (PHQ-2/PHQ-9) 10/28/2021   PHQ-2 Total Score 2   PHQ-9 Total Score 9       Interpretation of PHQ-9 Total Score   Score Severity   1-4 No Depression   5-9 Mild Depression   10-14 Moderate Depression   15-19 Moderately Severe Depression   20-27 Severe Depression    CURRENT RISK:       Suicidal: Low       Homicidal: Low       Self-Harm: Low       Relapse: Low       Crisis Safety Plan Reviewed Not Indicated       If evidence of imminent risk is present, intervention/plan:      MEDICAL RECORDS/LABS/DIAGNOSTIC TESTS REVIEWED:  No new lab since last visit     [place]  records -   Reviewed       DIAGNOSTIC IMPRESSION(S):  1. MARIA G, panic attacks  2. R/o comorbid depression        PLAN:  (1) MARIA G, panic attacks rule out comorbid depression  · Not improving  · Increase Lexapro to 20 mg daily after dinner for anxiety and mood management. 90 tabs with 0 refill given.  · Add doxepin 25 mg at bedtime as needed for sleep.  60 tabs with 0 refill given.  · Patient currently not interested in psychotherapy.  · Continue follow-up with neurology for management of comorbid medical conditions.  · Medication options, alternatives (including no medications) and medication risks/benefits/side effects were discussed in detail.  · The patient was advised to call, message provider on [employer], or come in to the clinic if symptoms worsen or if any  future questions/issues regarding their medications arise; the patient verbalized understanding and agreement.    · The patient was educated to call 911, call the suicide hotline, or go to local ER if having thoughts of suicide or homicide; verbalized understanding.    Billing Coding based on:  26089: based on MDM    Return to clinic in 6 weeks or sooner if symptoms worsen.  Next Appointment: instruction provided on how to make the next appointment.     The proposed treatment plan was discussed with the patient who was provided the opportunity to ask questions and make suggestions regarding alternative treatment. Patient verbalized understanding and expressed agreement with the plan.       Randall Miller M.D.  12/07/21    This note was created using voice recognition software (Dragon). The accuracy of the dictation is limited by the abilities of the software. I have reviewed the note prior to signing, however some errors in grammar and context are still possible. If you have any questions related to this note please do not hesitate to contact our office.

## 2022-02-02 DIAGNOSIS — G47.09 OTHER INSOMNIA: ICD-10-CM

## 2022-02-02 RX ORDER — DOXEPIN HYDROCHLORIDE 25 MG/1
25 CAPSULE ORAL NIGHTLY PRN
Qty: 60 CAPSULE | Refills: 0 | Status: SHIPPED | OUTPATIENT
Start: 2022-02-02 | End: 2022-02-16

## 2022-03-25 DIAGNOSIS — F41.1 GAD (GENERALIZED ANXIETY DISORDER): ICD-10-CM

## 2022-03-25 DIAGNOSIS — F41.0 PANIC ATTACKS: ICD-10-CM

## 2022-03-25 RX ORDER — ESCITALOPRAM OXALATE 20 MG/1
20 TABLET ORAL DAILY
Qty: 90 TABLET | Refills: 1 | Status: SHIPPED | OUTPATIENT
Start: 2022-03-25

## 2024-11-09 ENCOUNTER — APPOINTMENT (OUTPATIENT)
Dept: RADIOLOGY | Facility: MEDICAL CENTER | Age: 54
End: 2024-11-09
Attending: EMERGENCY MEDICINE
Payer: COMMERCIAL

## 2024-11-09 ENCOUNTER — HOSPITAL ENCOUNTER (EMERGENCY)
Facility: MEDICAL CENTER | Age: 54
End: 2024-11-09
Attending: EMERGENCY MEDICINE
Payer: COMMERCIAL

## 2024-11-09 VITALS
HEART RATE: 74 BPM | RESPIRATION RATE: 14 BRPM | HEIGHT: 68 IN | DIASTOLIC BLOOD PRESSURE: 78 MMHG | TEMPERATURE: 98 F | SYSTOLIC BLOOD PRESSURE: 133 MMHG | OXYGEN SATURATION: 95 % | BODY MASS INDEX: 33.91 KG/M2 | WEIGHT: 223.77 LBS

## 2024-11-09 DIAGNOSIS — K62.89 RECTAL PAIN: ICD-10-CM

## 2024-11-09 DIAGNOSIS — K64.5 THROMBOSED HEMORRHOIDS: ICD-10-CM

## 2024-11-09 DIAGNOSIS — K76.9 LESION OF LIVER: ICD-10-CM

## 2024-11-09 LAB
ALBUMIN SERPL BCP-MCNC: 4.2 G/DL (ref 3.2–4.9)
ALBUMIN/GLOB SERPL: 1.4 G/DL
ALP SERPL-CCNC: 75 U/L (ref 30–99)
ALT SERPL-CCNC: 17 U/L (ref 2–50)
ANION GAP SERPL CALC-SCNC: 9 MMOL/L (ref 7–16)
APTT PPP: 28.2 SEC (ref 24.7–36)
AST SERPL-CCNC: 23 U/L (ref 12–45)
BASOPHILS # BLD AUTO: 0.6 % (ref 0–1.8)
BASOPHILS # BLD: 0.04 K/UL (ref 0–0.12)
BILIRUB SERPL-MCNC: 0.5 MG/DL (ref 0.1–1.5)
BUN SERPL-MCNC: 17 MG/DL (ref 8–22)
CALCIUM ALBUM COR SERPL-MCNC: 9.1 MG/DL (ref 8.5–10.5)
CALCIUM SERPL-MCNC: 9.3 MG/DL (ref 8.5–10.5)
CHLORIDE SERPL-SCNC: 106 MMOL/L (ref 96–112)
CO2 SERPL-SCNC: 24 MMOL/L (ref 20–33)
CREAT SERPL-MCNC: 0.79 MG/DL (ref 0.5–1.4)
EOSINOPHIL # BLD AUTO: 0.1 K/UL (ref 0–0.51)
EOSINOPHIL NFR BLD: 1.4 % (ref 0–6.9)
ERYTHROCYTE [DISTWIDTH] IN BLOOD BY AUTOMATED COUNT: 41.1 FL (ref 35.9–50)
GFR SERPLBLD CREATININE-BSD FMLA CKD-EPI: 104 ML/MIN/1.73 M 2
GLOBULIN SER CALC-MCNC: 2.9 G/DL (ref 1.9–3.5)
GLUCOSE SERPL-MCNC: 103 MG/DL (ref 65–99)
HCT VFR BLD AUTO: 46.3 % (ref 42–52)
HGB BLD-MCNC: 15.9 G/DL (ref 14–18)
IMM GRANULOCYTES # BLD AUTO: 0.02 K/UL (ref 0–0.11)
IMM GRANULOCYTES NFR BLD AUTO: 0.3 % (ref 0–0.9)
INR PPP: 1 (ref 0.87–1.13)
LIPASE SERPL-CCNC: 23 U/L (ref 11–82)
LYMPHOCYTES # BLD AUTO: 2.3 K/UL (ref 1–4.8)
LYMPHOCYTES NFR BLD: 31.9 % (ref 22–41)
MCH RBC QN AUTO: 29.9 PG (ref 27–33)
MCHC RBC AUTO-ENTMCNC: 34.3 G/DL (ref 32.3–36.5)
MCV RBC AUTO: 87.2 FL (ref 81.4–97.8)
MONOCYTES # BLD AUTO: 0.54 K/UL (ref 0–0.85)
MONOCYTES NFR BLD AUTO: 7.5 % (ref 0–13.4)
NEUTROPHILS # BLD AUTO: 4.22 K/UL (ref 1.82–7.42)
NEUTROPHILS NFR BLD: 58.3 % (ref 44–72)
NRBC # BLD AUTO: 0 K/UL
NRBC BLD-RTO: 0 /100 WBC (ref 0–0.2)
PLATELET # BLD AUTO: 241 K/UL (ref 164–446)
PMV BLD AUTO: 10.1 FL (ref 9–12.9)
POTASSIUM SERPL-SCNC: 4.1 MMOL/L (ref 3.6–5.5)
PROT SERPL-MCNC: 7.1 G/DL (ref 6–8.2)
PROTHROMBIN TIME: 13.2 SEC (ref 12–14.6)
RBC # BLD AUTO: 5.31 M/UL (ref 4.7–6.1)
SODIUM SERPL-SCNC: 139 MMOL/L (ref 135–145)
WBC # BLD AUTO: 7.2 K/UL (ref 4.8–10.8)

## 2024-11-09 PROCEDURE — 85730 THROMBOPLASTIN TIME PARTIAL: CPT

## 2024-11-09 PROCEDURE — 80053 COMPREHEN METABOLIC PANEL: CPT

## 2024-11-09 PROCEDURE — 85025 COMPLETE CBC W/AUTO DIFF WBC: CPT

## 2024-11-09 PROCEDURE — 700117 HCHG RX CONTRAST REV CODE 255: Performed by: EMERGENCY MEDICINE

## 2024-11-09 PROCEDURE — 74177 CT ABD & PELVIS W/CONTRAST: CPT

## 2024-11-09 PROCEDURE — 36415 COLL VENOUS BLD VENIPUNCTURE: CPT

## 2024-11-09 PROCEDURE — 83690 ASSAY OF LIPASE: CPT

## 2024-11-09 PROCEDURE — 99283 EMERGENCY DEPT VISIT LOW MDM: CPT

## 2024-11-09 PROCEDURE — 85610 PROTHROMBIN TIME: CPT

## 2024-11-09 RX ORDER — HYDROCORTISONE ACETATE 25 MG/1
25 SUPPOSITORY RECTAL EVERY 12 HOURS
Qty: 20 SUPPOSITORY | Refills: 0 | Status: SHIPPED | OUTPATIENT
Start: 2024-11-09 | End: 2024-11-19

## 2024-11-09 RX ADMIN — IOHEXOL 100 ML: 350 INJECTION, SOLUTION INTRAVENOUS at 12:30

## 2024-11-09 ASSESSMENT — FIBROSIS 4 INDEX: FIB4 SCORE: 1.01

## 2024-11-09 NOTE — ED PROVIDER NOTES
"ED Provider Note    CHIEF COMPLAINT  Chief Complaint   Patient presents with    Rectal Pain     X 1 month   Ami trauma      Bloody Stools     \"Black tarry stool\" x3wks     Intermittent diffuse abd pain and pressure \" sensation of needing to go all the time and it burns when I do go \"        EXTERNAL RECORDS REVIEWED  External ED Note patient was seen at [facility] emergency department 10/18/2023 complaining of headache and blurry vision he was diagnosed with headache and prescribed medications after having improvement with Toradol and Phenergan and Benadryl    HPI/ROS  LIMITATION TO HISTORY   Select: Language Tunisian,  Used   OUTSIDE HISTORIAN(S):  Family the patient's daughter speaks fluent Tunisian and English and helps with the history.  She states that he has had 3 weeks of black stools and diffuse abdominal pain and pressure when he needs to go to the bathroom and have a bowel movement.  He has not had any bright red bleeding.  He has not had night sweats or weight loss.  He drinks occasionally he has never had a colonoscopy he denies any tobacco use.  There is no family history of colon cancer    Amari Calero is a 54 y.o. male who presents complaining of rectal pain for a month with urgency when he goes to the bathroom and cramping abdominal pain.  He is also had 3 weeks of dark tarry stools.  He denies any fevers or chills night sweats or weight loss.  He has never had a colonoscopy.  He does have a primary care physician but could not be seen until December so his daughter brought him here for further evaluation.  He has not had any nausea or vomiting.  He drinks occasionally.  He denies tobacco or drug use.    PAST MEDICAL HISTORY   has a past medical history of Bowel habit changes, Heart burn, Indigestion, and Snoring.    SURGICAL HISTORY   has a past surgical history that includes hernia repair (1995,2009) and inguinal hernia repair (Left, 6/10/2020).    FAMILY HISTORY  No family " "history on file.    SOCIAL HISTORY  Social History     Tobacco Use    Smoking status: Former     Current packs/day: 1.00     Average packs/day: 1 pack/day for 12.0 years (12.0 ttl pk-yrs)     Types: Cigarettes    Smokeless tobacco: Never   Substance and Sexual Activity    Alcohol use: Yes     Comment: 3 drinks per week     Drug use: Never    Sexual activity: Not on file       CURRENT MEDICATIONS  Home Medications       Reviewed by Marta Saul R.N. (Registered Nurse) on 11/09/24 at 0933  Med List Status: Not Addressed     Medication Last Dose Status   Cyanocobalamin (VITAMIN B 12 PO)  Active   doxepin (SINEQUAN) 25 MG Cap  Active   escitalopram (LEXAPRO) 20 MG tablet  Active   therapeutic multivitamin-minerals (THERAGRAN-M) Tab  Active                  Audit from Redirected Encounters    **Home medications have not yet been reviewed for this encounter**         ALLERGIES  Allergies   Allergen Reactions    Asa [Aspirin]      Rash, hives        PHYSICAL EXAM  VITAL SIGNS: BP (!) 163/88   Pulse 81   Temp 36.7 °C (98 °F) (Temporal)   Resp 16   Ht 1.727 m (5' 8\")   Wt 101 kg (223 lb 12.3 oz)   SpO2 96%   BMI 34.02 kg/m²      Constitutional: Well developed, Well nourished, No acute distress, Non-toxic appearance.   HEENT: Normocephalic, Atraumatic,  external ears normal, pharynx pink,  Mucous  Membranes moist, No rhinorrhea or mucosal edema  Eyes: PERRL, EOMI, Conjunctiva normal, No discharge.   Neck: Normal range of motion, No tenderness, Supple, No stridor.   Lymphatic: No lymphadenopathy    Cardiovascular: Regular Rate and Rhythm, No murmurs,  rubs, or gallops.   Thorax & Lungs: Lungs clear to auscultation bilaterally, No respiratory distress, No wheezes, rhales or rhonchi, No chest wall tenderness.   Abdomen: Bowel sounds normal, Soft, non tender, non distended,  No pulsatile masses., no rebound guarding or peritoneal signs.  Rectal: Patient has a few external hemorrhoids that are not inflamed or " thrombosed or bleeding on rectal exam he has some mildly tender he has no stool in the rectal vault but his mucosa is Hemoccult negative  Skin: Warm, Dry, No erythema, No rash,   Back:  No CVA tenderness,  No spinal tenderness, bony crepitance step offs or instability.   Extremities: Equal, intact distal pulses, No cyanosis, clubbing or edema,  No tenderness.   Musculoskeletal: Good range of motion in all major joints. No tenderness to palpation or major deformities noted.   Neurologic: Alert & oriented No focal deficits noted.  Psychiatric: Affect normal, Judgment normal, Mood normal.      EKG/LABS  Results for orders placed or performed during the hospital encounter of 11/09/24   COMP METABOLIC PANEL    Collection Time: 11/09/24 10:04 AM   Result Value Ref Range    Sodium 139 135 - 145 mmol/L    Potassium 4.1 3.6 - 5.5 mmol/L    Chloride 106 96 - 112 mmol/L    Co2 24 20 - 33 mmol/L    Anion Gap 9.0 7.0 - 16.0    Glucose 103 (H) 65 - 99 mg/dL    Bun 17 8 - 22 mg/dL    Creatinine 0.79 0.50 - 1.40 mg/dL    Calcium 9.3 8.5 - 10.5 mg/dL    Correct Calcium 9.1 8.5 - 10.5 mg/dL    AST(SGOT) 23 12 - 45 U/L    ALT(SGPT) 17 2 - 50 U/L    Alkaline Phosphatase 75 30 - 99 U/L    Total Bilirubin 0.5 0.1 - 1.5 mg/dL    Albumin 4.2 3.2 - 4.9 g/dL    Total Protein 7.1 6.0 - 8.2 g/dL    Globulin 2.9 1.9 - 3.5 g/dL    A-G Ratio 1.4 g/dL   LIPASE    Collection Time: 11/09/24 10:04 AM   Result Value Ref Range    Lipase 23 11 - 82 U/L   CBC WITH DIFFERENTIAL    Collection Time: 11/09/24 10:04 AM   Result Value Ref Range    WBC 7.2 4.8 - 10.8 K/uL    RBC 5.31 4.70 - 6.10 M/uL    Hemoglobin 15.9 14.0 - 18.0 g/dL    Hematocrit 46.3 42.0 - 52.0 %    MCV 87.2 81.4 - 97.8 fL    MCH 29.9 27.0 - 33.0 pg    MCHC 34.3 32.3 - 36.5 g/dL    RDW 41.1 35.9 - 50.0 fL    Platelet Count 241 164 - 446 K/uL    MPV 10.1 9.0 - 12.9 fL    Neutrophils-Polys 58.30 44.00 - 72.00 %    Lymphocytes 31.90 22.00 - 41.00 %    Monocytes 7.50 0.00 - 13.40 %     Eosinophils 1.40 0.00 - 6.90 %    Basophils 0.60 0.00 - 1.80 %    Immature Granulocytes 0.30 0.00 - 0.90 %    Nucleated RBC 0.00 0.00 - 0.20 /100 WBC    Neutrophils (Absolute) 4.22 1.82 - 7.42 K/uL    Lymphs (Absolute) 2.30 1.00 - 4.80 K/uL    Monos (Absolute) 0.54 0.00 - 0.85 K/uL    Eos (Absolute) 0.10 0.00 - 0.51 K/uL    Baso (Absolute) 0.04 0.00 - 0.12 K/uL    Immature Granulocytes (abs) 0.02 0.00 - 0.11 K/uL    NRBC (Absolute) 0.00 K/uL   PROTHROMBIN TIME    Collection Time: 11/09/24 10:04 AM   Result Value Ref Range    PT 13.2 12.0 - 14.6 sec    INR 1.00 0.87 - 1.13   APTT    Collection Time: 11/09/24 10:04 AM   Result Value Ref Range    APTT 28.2 24.7 - 36.0 sec   ESTIMATED GFR    Collection Time: 11/09/24 10:04 AM   Result Value Ref Range    GFR (CKD-EPI) 104 >60 mL/min/1.73 m 2       I have independently interpreted this EKG    RADIOLOGY/PROCEDURES   I have independently interpreted the diagnostic imaging associated with this visit and am waiting the final reading from the radiologist.   My preliminary interpretation is as follows: CT abdomen pelvis no obstruction    Radiologist interpretation:  CT-ABDOMEN-PELVIS WITH   Final Result      1.  Punctate liver lesions are identified which could be benign hemangiomas or cysts. Neoplastic lesions are also possible. Follow-up liver ultrasound could be obtained.      2.  Otherwise no acute abnormalities are identified in the abdomen or pelvis.          COURSE & MEDICAL DECISION MAKING    ASSESSMENT, COURSE AND PLAN  Care Narrative: Amari Calero is a 54 y.o. male who presents complaining of rectal pain for a month with urgency when he goes to the bathroom and cramping abdominal pain.  He is also had 3 weeks of dark tarry stools.  He denies any fevers or chills night sweats or weight loss.  He has never had a colonoscopy.  He does have a primary care physician but could not be seen until December so his daughter brought him here for further evaluation.  He  has not had any nausea or vomiting.  He drinks occasionally.  He denies tobacco or drug use.  Physical exam he has no external hemorrhoids or active bleeding or Hemoccult positive mucosa.  There is no stool in the rectal vault.  Abdomen is soft no rebound masses or peritoneal signs              ADDITIONAL PROBLEMS MANAGED      DISPOSITION AND DISCUSSIONS    An IV was started and labs were drawn to CT on pelvis to further evaluate the patient's symptoms.    Patient's white count is normal 7.2 hemoglobin 15.9 plate count 241 with a normal differential.  Comprehensive metabolic panel is a slightly elevated glucose of 103 otherwise electrolytes kidney function and liver function tests are normal.  Coags are also normal.    CT of the abdomen pelvis shows punctate liver lesions which could be hemangiomas cysts or neoplasm and no other abnormalities.    I will discharge the patient home with Anusol suppositories for his rectal pain.  I have placed referrals to help him establish with GI and with primary care.  The patient needs a colonoscopy to get a better look of his abdomen and an MRI or ultrasound of his liver to get a better look at the liver lesions.  The patient and daughter understands he needs to follow-up for outpatient care for these problems.  He will take the Anusol suppositories to see if it improves his rectal pain and should return to emergency department for any worsening symptoms.  I have placed referrals through our system with GI and primary care.  I have discussed management of the patient with the following physicians and LAINA's: None    Discussion of management with other QHP or appropriate source(s): None     Escalation of care considered, and ultimately not performed:acute inpatient care management, however at this time, the patient is most appropriate for outpatient management    Barriers to care at this time, including but not limited to: Patient does not have established PCP.     Decision tools  and prescription drugs considered including, but not limited to: Pain Medications Anusol suppositories .      The patient will return for new or worsening symptoms and is stable at the time of discharge.    The patient is referred to a primary physician for blood pressure management, diabetic screening, and for all other preventative health concerns.        DISPOSITION:  Patient will be discharged home in stable condition.    FOLLOW UP:  [facility]  [address] [facility] [address]  [phone]  Call in 1 day  to establish care    [facility]  [address] [place] [id number]  [phone]  Call in 1 day  to establish care    establish with a primary care provider            OUTPATIENT MEDICATIONS:  New Prescriptions    HYDROCORTISONE (ANUSOL-HC) 25 MG SUPPOS    Insert 1 Suppository into the rectum every 12 hours for 10 days.       FINAL DIAGNOSIS  1. Rectal pain    2. Lesion of liver    3. Thrombosed hemorrhoids         Electronically signed by: Stephanie Trujillo M.D., 11/9/2024 10:03 AM

## 2024-11-09 NOTE — ED TRIAGE NOTES
"Chief Complaint   Patient presents with    Rectal Pain     X 1 month   Ami trauma      Bloody Stools     \"Black tarry stool\" x3wks     Intermittent diffuse abd pain and pressure \" sensation of needing to go all the time and it burns when I do go \"      BP (!) 163/88   Pulse 81   Temp 36.7 °C (98 °F) (Temporal)   Resp 16   Ht 1.727 m (5' 8\")   Wt 101 kg (223 lb 12.3 oz)   SpO2 96%   BMI 34.02 kg/m²     Pt is alert/oriented and follows commands. Pt speaking in full sentences and responds appropriately to questions. No acute distress noted in triage and respirations are even and unlabored.      Pt placed in lobby and educated on triage process. Pt encouraged to alert staff for any changes in condition.    "

## 2024-11-09 NOTE — DISCHARGE INSTRUCTIONS
Have several lesions in your liver that could be benign hemangiomas but also could be cancerous and need further workup with a GI doctor.  I have referred you to both GI groups outpatient and you should call to establish care.  Also should establish care with a primary care physician and I placed a referral for this.  Use the steroid suppositories to see if they will help her symptoms of rectal pain.

## 2025-06-17 ENCOUNTER — TELEPHONE (OUTPATIENT)
Dept: SCHEDULING | Facility: IMAGING CENTER | Age: 55
End: 2025-06-17
Payer: COMMERCIAL

## (undated) DEVICE — GLOVE BIOGEL SZ 7 SURGICAL PF LTX - (50PR/BX 4BX/CA)

## (undated) DEVICE — SUTURE GENERAL

## (undated) DEVICE — ELECTRODE 850 FOAM ADHESIVE - HYDROGEL RADIOTRNSPRNT (50/PK)

## (undated) DEVICE — GOWN WARMING STANDARD FLEX - (30/CA)

## (undated) DEVICE — CHLORAPREP 26 ML APPLICATOR - ORANGE TINT(25/CA)

## (undated) DEVICE — DRAIN PENROSE STERILE 1/4 X - 18 IN  (25EA/BX)

## (undated) DEVICE — SET LEADWIRE 5 LEAD BEDSIDE DISPOSABLE ECG (1SET OF 5/EA)

## (undated) DEVICE — NEPTUNE 4 PORT MANIFOLD - (20/PK)

## (undated) DEVICE — SUTURE 3-0 VICRYL PLUS - 12 X 18 INCH (12/BX)

## (undated) DEVICE — GLOVE BIOGEL SZ 8 SURGICAL PF LTX - (50PR/BX 4BX/CA)

## (undated) DEVICE — NEEDLE NON SAFETY HYPO 22 GA X 1 1/2 IN (100/BX)

## (undated) DEVICE — SUTURE 3-0 VICRYL PLUS SH - 8X 18 INCH (12/BX)

## (undated) DEVICE — GLOVE BIOGEL INDICATOR SZ 7.5 SURGICAL PF LTX - (50PR/BX 4BX/CA)

## (undated) DEVICE — PACK MINOR BASIN - (2EA/CA)

## (undated) DEVICE — SENSOR SPO2 NEO LNCS ADHESIVE (20/BX) SEE USER NOTES

## (undated) DEVICE — SUTURE 4-0 MONOCRYL PLUS PS-2 - 27 INCH (36/BX)

## (undated) DEVICE — SUCTION INSTRUMENT YANKAUER BULBOUS TIP W/O VENT (50EA/CA)

## (undated) DEVICE — BLADE SURGICAL #15 - (50/BX 3BX/CA)

## (undated) DEVICE — SYRINGE 10 ML CONTROL LL (25EA/BX 4BX/CA)

## (undated) DEVICE — SLEEVE, VASO, THIGH, MED

## (undated) DEVICE — ELECTRODE DUAL RETURN W/ CORD - (50/PK)

## (undated) DEVICE — LACTATED RINGERS INJ 1000 ML - (14EA/CA 60CA/PF)

## (undated) DEVICE — GLOVE BIOGEL SZ 7.5 SURGICAL PF LTX - (50PR/BX 4BX/CA)

## (undated) DEVICE — SET EXTENSION WITH 2 PORTS (48EA/CA) ***PART #2C8610 IS A SUBSTITUTE*****

## (undated) DEVICE — ADHESIVE DERMABOND HVD MINI (12EA/BX)

## (undated) DEVICE — MASK ANESTHESIA ADULT  - (100/CA)

## (undated) DEVICE — HEAD HOLDER JUNIOR/ADULT

## (undated) DEVICE — DRAPE IOBAN II INCISE 23X17 - (10EA/BX 4BX/CA)

## (undated) DEVICE — KIT ANESTHESIA W/CIRCUIT & 3/LT BAG W/FILTER (20EA/CA)

## (undated) DEVICE — CANISTER SUCTION 3000ML MECHANICAL FILTER AUTO SHUTOFF MEDI-VAC NONSTERILE LF DISP  (40EA/CA)

## (undated) DEVICE — GLOVE BIOGEL PI INDICATOR SZ 6.5 SURGICAL PF LF - (50/BX 4BX/CA)

## (undated) DEVICE — SUTURE 0 VICRYL PLUS CT-2 - 27 INCH (36/BX)

## (undated) DEVICE — GLOVE BIOGEL SZ 6.5 SURGICAL PF LTX (50PR/BX 4BX/CA)

## (undated) DEVICE — PROTECTOR ULNA NERVE - (36PR/CA)

## (undated) DEVICE — SODIUM CHL IRRIGATION 0.9% 1000ML (12EA/CA)

## (undated) DEVICE — TUBING CLEARLINK DUO-VENT - C-FLO (48EA/CA)

## (undated) DEVICE — DRAPE LAPAROTOMY T SHEET - (12EA/CA)